# Patient Record
Sex: MALE | Race: WHITE
[De-identification: names, ages, dates, MRNs, and addresses within clinical notes are randomized per-mention and may not be internally consistent; named-entity substitution may affect disease eponyms.]

---

## 2019-03-12 ENCOUNTER — HOSPITAL ENCOUNTER (EMERGENCY)
Dept: HOSPITAL 74 - JER | Age: 54
LOS: 1 days | Discharge: HOME | End: 2019-03-13
Payer: COMMERCIAL

## 2019-03-12 VITALS — HEART RATE: 82 BPM | SYSTOLIC BLOOD PRESSURE: 125 MMHG | TEMPERATURE: 97.8 F | DIASTOLIC BLOOD PRESSURE: 69 MMHG

## 2019-03-12 VITALS — BODY MASS INDEX: 37.2 KG/M2

## 2019-03-12 DIAGNOSIS — R07.9: Primary | ICD-10-CM

## 2019-03-12 LAB
ALBUMIN SERPL-MCNC: 3.8 G/DL (ref 3.4–5)
ALP SERPL-CCNC: 76 U/L (ref 45–117)
ALT SERPL-CCNC: 56 U/L (ref 13–61)
ANION GAP SERPL CALC-SCNC: 6 MMOL/L (ref 8–16)
AST SERPL-CCNC: 17 U/L (ref 15–37)
BASOPHILS # BLD: 0.2 % (ref 0–2)
BILIRUB SERPL-MCNC: 1 MG/DL (ref 0.2–1)
BUN SERPL-MCNC: 17 MG/DL (ref 7–18)
CALCIUM SERPL-MCNC: 9.3 MG/DL (ref 8.5–10.1)
CHLORIDE SERPL-SCNC: 107 MMOL/L (ref 98–107)
CO2 SERPL-SCNC: 27 MMOL/L (ref 21–32)
CREAT SERPL-MCNC: 1 MG/DL (ref 0.55–1.3)
DEPRECATED RDW RBC AUTO: 13.8 % (ref 11.9–15.9)
EOSINOPHIL # BLD: 0.6 % (ref 0–4.5)
GLUCOSE SERPL-MCNC: 103 MG/DL (ref 74–106)
HCT VFR BLD CALC: 43.9 % (ref 35.4–49)
HGB BLD-MCNC: 15 GM/DL (ref 11.7–16.9)
INR BLD: 1.09 (ref 0.83–1.09)
LYMPHOCYTES # BLD: 20.1 % (ref 8–40)
MAGNESIUM SERPL-MCNC: 1.6 MG/DL (ref 1.8–2.4)
MCH RBC QN AUTO: 28.7 PG (ref 25.7–33.7)
MCHC RBC AUTO-ENTMCNC: 34.2 G/DL (ref 32–35.9)
MCV RBC: 83.9 FL (ref 80–96)
MONOCYTES # BLD AUTO: 6.2 % (ref 3.8–10.2)
NEUTROPHILS # BLD: 72.9 % (ref 42.8–82.8)
PLATELET # BLD AUTO: 158 K/MM3 (ref 134–434)
PMV BLD: 12.1 FL (ref 7.5–11.1)
POTASSIUM SERPLBLD-SCNC: 4.3 MMOL/L (ref 3.5–5.1)
PROT SERPL-MCNC: 7.2 G/DL (ref 6.4–8.2)
PT PNL PPP: 12.9 SEC (ref 9.7–13)
RBC # BLD AUTO: 5.23 M/MM3 (ref 4–5.6)
SODIUM SERPL-SCNC: 139 MMOL/L (ref 136–145)
WBC # BLD AUTO: 7.7 K/MM3 (ref 4–10)

## 2019-03-12 NOTE — PDOC
Attending Attestation





- HPI


HPI: 





03/12/19 21:12


The patient is a 53 year old male with a PMH of obesity who presents to the ER 

with chest pain is 10AM today. Patient describes the chest pain as a burning 

sensation in the epigastric region that worsens when lying flat. Patient also 

reports 1 episode of NB, NB vomit. Patient is not currently following up with 

cardiology. 





Patient denies any shortness of breath, leg swelling, fever, chills, 

constipation or diarrhea. 





Allergies: NKDA 


Social Hx: None reported.


Surgeries: None reported. 








- Physicial Exam


PE: 





03/12/19 21:12


Agrees with resident's exam. 





<Pau Clements - Last Filed: 03/12/19 21:12>





- Resident


Resident Name: Pedro Herron





- ED Attending Attestation


I have performed the following: I have examined & evaluated the patient, The 

case was reviewed & discussed with the resident, I agree w/resident's findings 

& plan





- Medical Decision Making





03/12/19 21:27


53-year-old male with epigastric burning radiating into the chest, currently 

chest pain-free after PPI


EKG shows a normal sinus rhythm at 60 bpm with no acute ST elevations


There is a left anterior fascicular block unchanged from previous


Rhythm strip shows a sinus rhythm at 65-70 bpm


Pain has been present for greater than 10 hours, plan for troponin 2 with 

likely discharged home





<Megan Neville - Last Filed: 03/12/19 21:27>

## 2019-03-12 NOTE — PDOC
History of Present Illness





- General


Chief Complaint: Chest Pain


Stated Complaint: CHEST PAIN


Time Seen by Provider: 03/12/19 19:56


History Source: Patient


Exam Limitations: No Limitations





- History of Present Illness


Initial Comments: 





03/12/19 20:48


Patient is a 53M with history of obesity here today complaining of chest pain 

that onset at 10am. Patient describes the pain as a burning sensation that 

improves with omeprazole. Patient states the pain was 10/10 earlier today, then 

5/10 at triage. Patient is now pain free. Denies fevers, chills, nausea, 

vomiting. Denies cough, shortness of breath. Denies leg swelling, recent travel 

and history of blood clots. Has never seen cardiologist.





Patient reports that he felt a sour taste in his mouth this morning and vomited 

once. Pain was worse when he was laying down.





Past History





- Past Medical History


Allergies/Adverse Reactions: 


 Allergies











Allergy/AdvReac Type Severity Reaction Status Date / Time


 


No Known Allergies Allergy   Verified 03/12/19 18:34











COPD: No





- Suicide/Smoking/Psychosocial Hx


Smoking History: Never smoked


Have you smoked in the past 12 months: No


Hx Alcohol Use: No


Drug/Substance Use Hx: No


Substance Use Type: None





**Review of Systems





- Review of Systems


Comments:: 





03/12/19 20:57


GENERAL/CONSTITUTIONAL: No fever or chills. No weakness.


HEAD, EYES, EARS, NOSE AND THROAT: No change in vision. No ear pain or 

discharge. No sore throat.


CARDIOVASCULAR: +chest pain no shortness of breath


RESPIRATORY: No cough, wheezing, or hemoptysis.


GASTROINTESTINAL: +nausea, +vomiting, no diarrhea or constipation.


GENITOURINARY: No dysuria, frequency, or change in urination.


MUSCULOSKELETAL: No joint or muscle swelling or pain. No neck or back pain.


SKIN: No rash


NEUROLOGIC: No headache, vertigo, loss of consciousness, or change in strength/

sensation.


ENDOCRINE: No increased thirst. No abnormal weight change


HEMATOLOGIC/LYMPHATIC: No anemia, easy bleeding, or history of blood clots.


ALLERGIC/IMMUNOLOGIC: No hives or skin allergy.








*Physical Exam





- Vital Signs


 Last Vital Signs











Temp Pulse Resp BP Pulse Ox


 


 97.8 F   82   18   125/69   99 


 


 03/12/19 18:34  03/12/19 18:34  03/12/19 18:34  03/12/19 18:34  03/12/19 18:34














- Physical Exam


Comments: 





03/12/19 20:58


GENERAL: Awake, alert, and fully oriented, in no acute distress


HEAD: No signs of trauma, normocephalic, atraumatic


EYES: PERRLA, EOMI, sclera anicteric, conjunctiva clear


ENT: Auricles normal inspection, hearing grossly normal, nares patent, 

oropharynx clear without


exudates. Moist mucosa


NECK: Normal ROM, supple, no lymphadenopathy, JVD, or masses


LUNGS: No distress, speaks full sentences, clear to auscultation bilaterally


HEART: Regular rate and rhythm, normal S1 and S2, no murmurs, rubs or gallops, 

peripheral pulses normal and equal bilaterally.


ABDOMEN: Soft, nontender, normoactive bowel sounds.  No guarding, no rebound.  

No masses


EXTREMITIES: Normal inspection, Normal range of motion, no edema.  No clubbing 

or cyanosis.


NEUROLOGICAL: Cranial nerves II through XII grossly intact.  Normal speech, no 

focal sensorimotor deficits


SKIN: Warm, Dry, normal turgor, no rashes or lesions noted.








Moderate Sedation





- Procedure Monitoring


Vital Signs: 


Procedure Monitoring Vital Signs











Temperature  97.8 F   03/12/19 18:34


 


Pulse Rate  82   03/12/19 18:34


 


Respiratory Rate  18   03/12/19 18:34


 


Blood Pressure  125/69   03/12/19 18:34


 


O2 Sat by Pulse Oximetry (%)  99   03/12/19 18:34











ED Treatment Course





- LABORATORY


CBC & Chemistry Diagram: 


 03/12/19 20:44





 03/12/19 20:44





- RADIOLOGY


Radiology Studies Ordered: 














 Category Date Time Status


 


 CHEST PA & LAT [RAD] Stat Radiology  03/12/19 20:02 Ordered














Medical Decision Making





- Medical Decision Making





03/12/19 20:58


Patient is a 53M with history of obesity here today with chest pain. Atypical. 

No other risk factors other than obesity. Vitals normal and stable. DDx includes

, but is not limited to: ACS, GERD, pneumonia. Do not suspect PE or dissection 

at this time. Pain free, no treatment necessary. HEART score 2 pending trop. 

Will do HEART pathway, two trops.





IV access obtained via US.


03/13/19 00:16


Trops negative x2.





CBC, CMP reassuring.





CXR clear.





Will discharge with cardiology follow up.





*DC/Admit/Observation/Transfer


Diagnosis at time of Disposition: 


 Chest pain








- Discharge Dispostion


Disposition: HOME


Condition at time of disposition: Good


Decision to Admit order: No





- Referrals


Referrals: 


Ashley Alexis MD [Primary Care Provider] - 


Addi Naranjo MD [Staff Physician] - 





- Patient Instructions


Printed Discharge Instructions:  DI for Chest Pain


Additional Instructions: 


Please return if you have any new, worsening or concerning symptoms, especially 

increasing pain, fever and shortness of breath.





Please follow up with a cardiologist and your primary care doctor this week.





- Post Discharge Activity

## 2019-03-13 NOTE — EKG
Test Reason : 

Blood Pressure : ***/*** mmHG

Vent. Rate : 068 BPM     Atrial Rate : 068 BPM

   P-R Int : 164 ms          QRS Dur : 084 ms

    QT Int : 438 ms       P-R-T Axes : 018 -29 -07 degrees

   QTc Int : 465 ms

 

SINUS RHYTHM WITH OCCASIONAL PREMATURE VENTRICULAR COMPLEXES

OTHERWISE NORMAL ECG

WHEN COMPARED WITH ECG OF 12-MAR-2019 18:16,

PREMATURE VENTRICULAR COMPLEXES ARE NOW PRESENT

Confirmed by ANGELIQUE QUINTERO, ROLY ([id number]) on 3/13/2019 11:10:05 AM

 

Referred By:             Confirmed By:ROLY MERINO MD

## 2019-03-16 NOTE — EKG
Test Reason : 

Blood Pressure : ***/*** mmHG

Vent. Rate : 068 BPM     Atrial Rate : 068 BPM

   P-R Int : 166 ms          QRS Dur : 080 ms

    QT Int : 430 ms       P-R-T Axes : 038 -48 -09 degrees

   QTc Int : 457 ms

 

NORMAL SINUS RHYTHM

LEFT ANTERIOR FASCICULAR BLOCK

CANNOT RULE OUT INFERIOR INFARCT (MASKED BY FASCICULAR BLOCK?) , AGE

UNDETERMINED

ABNORMAL ECG

WHEN COMPARED WITH ECG OF 10-JUL-2011 22:38,

NO SIGNIFICANT CHANGE WAS FOUND

Confirmed by MD JENNIFER, CLIFF ([id number]) on 3/16/2019 6:10:42 PM

 

Referred By:             Confirmed By:CLIFF RODRIGUEZ MD

## 2019-08-17 ENCOUNTER — HOSPITAL ENCOUNTER (EMERGENCY)
Dept: HOSPITAL 74 - FER | Age: 54
Discharge: LEFT BEFORE DISCHARGE | End: 2019-08-17
Payer: COMMERCIAL

## 2019-08-17 ENCOUNTER — HOSPITAL ENCOUNTER (EMERGENCY)
Dept: HOSPITAL 74 - JER | Age: 54
Discharge: HOME | End: 2019-08-17
Payer: COMMERCIAL

## 2022-03-25 ENCOUNTER — HOSPITAL ENCOUNTER (EMERGENCY)
Dept: HOSPITAL 74 - FER | Age: 57
Discharge: HOME | End: 2022-03-25
Payer: COMMERCIAL

## 2022-03-25 VITALS — TEMPERATURE: 97.7 F | HEART RATE: 75 BPM | DIASTOLIC BLOOD PRESSURE: 89 MMHG | SYSTOLIC BLOOD PRESSURE: 123 MMHG

## 2022-03-25 VITALS — BODY MASS INDEX: 37.2 KG/M2

## 2022-03-25 DIAGNOSIS — M25.561: ICD-10-CM

## 2022-03-25 DIAGNOSIS — M25.512: Primary | ICD-10-CM
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
## 2022-03-25 PROCEDURE — 3E023GC INTRODUCTION OF OTHER THERAPEUTIC SUBSTANCE INTO MUSCLE, PERCUTANEOUS APPROACH: ICD-10-PCS | Performed by: STUDENT IN AN ORGANIZED HEALTH CARE EDUCATION/TRAINING PROGRAM

## 2023-06-17 ENCOUNTER — HOSPITAL ENCOUNTER (EMERGENCY)
Dept: HOSPITAL 74 - JER | Age: 58
Discharge: ANOTHER HOSPITAL | End: 2023-06-17
Payer: COMMERCIAL

## 2023-06-17 VITALS — TEMPERATURE: 97.7 F | SYSTOLIC BLOOD PRESSURE: 123 MMHG | HEART RATE: 64 BPM | DIASTOLIC BLOOD PRESSURE: 76 MMHG

## 2023-06-17 VITALS — RESPIRATION RATE: 16 BRPM

## 2023-06-17 VITALS — BODY MASS INDEX: 35.9 KG/M2

## 2023-06-17 DIAGNOSIS — S80.212A: ICD-10-CM
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
## 2023-06-17 DIAGNOSIS — R07.89: ICD-10-CM
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
## 2023-06-17 DIAGNOSIS — Z20.822: ICD-10-CM

## 2023-06-17 DIAGNOSIS — T79.7XXA: Primary | ICD-10-CM

## 2023-06-17 DIAGNOSIS — R42: ICD-10-CM

## 2023-06-17 DIAGNOSIS — S60.512A: ICD-10-CM

## 2023-06-17 DIAGNOSIS — M79.605: ICD-10-CM
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
## 2023-06-17 DIAGNOSIS — R51.9: ICD-10-CM

## 2023-06-17 DIAGNOSIS — H57.13: ICD-10-CM

## 2023-06-17 DIAGNOSIS — R10.9: ICD-10-CM

## 2023-06-17 DIAGNOSIS — M79.604: ICD-10-CM

## 2023-06-17 DIAGNOSIS — M79.10: ICD-10-CM

## 2023-06-17 LAB
ALBUMIN SERPL-MCNC: 3.8 G/DL (ref 3.4–5)
ALP SERPL-CCNC: 65 U/L (ref 45–117)
ALT SERPL-CCNC: 66 U/L (ref 13–61)
ANION GAP SERPL CALC-SCNC: 8 MMOL/L (ref 8–16)
APTT BLD: 27.8 SECONDS (ref 25.2–36.5)
AST SERPL-CCNC: 38 U/L (ref 15–37)
BASOPHILS # BLD: 0.4 % (ref 0–2)
BILIRUB SERPL-MCNC: 0.9 MG/DL (ref 0.2–1)
BUN SERPL-MCNC: 20.6 MG/DL (ref 7–18)
CALCIUM SERPL-MCNC: 9.3 MG/DL (ref 8.5–10.1)
CHLORIDE SERPL-SCNC: 107 MMOL/L (ref 98–107)
CO2 SERPL-SCNC: 28 MMOL/L (ref 21–32)
CREAT SERPL-MCNC: 1.3 MG/DL (ref 0.55–1.3)
DEPRECATED RDW RBC AUTO: 14 % (ref 11.9–15.9)
EOSINOPHIL # BLD: 1.6 % (ref 0–4.5)
GLUCOSE SERPL-MCNC: 131 MG/DL (ref 74–106)
HCT VFR BLD CALC: 44.2 % (ref 35.4–49)
HGB BLD-MCNC: 14.5 GM/DL (ref 11.7–16.9)
INR BLD: 1.09 (ref 0.83–1.09)
LYMPHOCYTES # BLD: 40.8 % (ref 8–40)
MCH RBC QN AUTO: 27.3 PG (ref 25.7–33.7)
MCHC RBC AUTO-ENTMCNC: 32.8 G/DL (ref 32–35.9)
MCV RBC: 83.1 FL (ref 80–96)
MONOCYTES # BLD AUTO: 5.6 % (ref 3.8–10.2)
NEUTROPHILS # BLD: 51.6 % (ref 42.8–82.8)
PLATELET # BLD AUTO: 141 10^3/UL (ref 134–434)
PMV BLD: 12.5 FL (ref 7.5–11.1)
POTASSIUM SERPLBLD-SCNC: 4.3 MMOL/L (ref 3.5–5.1)
PROT SERPL-MCNC: 6.6 G/DL (ref 6.4–8.2)
PT PNL PPP: 12.6 SEC (ref 9.7–13)
RBC # BLD AUTO: 5.32 M/MM3 (ref 4–5.6)
SODIUM SERPL-SCNC: 142 MMOL/L (ref 136–145)
WBC # BLD AUTO: 7.2 K/MM3 (ref 4–10)

## 2023-06-17 PROCEDURE — 3E033GC INTRODUCTION OF OTHER THERAPEUTIC SUBSTANCE INTO PERIPHERAL VEIN, PERCUTANEOUS APPROACH: ICD-10-PCS

## 2023-06-17 PROCEDURE — 3E033NZ INTRODUCTION OF ANALGESICS, HYPNOTICS, SEDATIVES INTO PERIPHERAL VEIN, PERCUTANEOUS APPROACH: ICD-10-PCS
